# Patient Record
Sex: FEMALE | Race: WHITE
[De-identification: names, ages, dates, MRNs, and addresses within clinical notes are randomized per-mention and may not be internally consistent; named-entity substitution may affect disease eponyms.]

---

## 2018-12-06 ENCOUNTER — HOSPITAL ENCOUNTER (OUTPATIENT)
Dept: HOSPITAL 92 - BICMAMMO | Age: 43
Discharge: HOME | End: 2018-12-06
Attending: OBSTETRICS & GYNECOLOGY
Payer: COMMERCIAL

## 2018-12-06 DIAGNOSIS — Z80.3: ICD-10-CM

## 2018-12-06 DIAGNOSIS — Z85.820: ICD-10-CM

## 2018-12-06 DIAGNOSIS — Z12.31: Primary | ICD-10-CM

## 2018-12-06 PROCEDURE — 77067 SCR MAMMO BI INCL CAD: CPT

## 2018-12-06 PROCEDURE — 77063 BREAST TOMOSYNTHESIS BI: CPT

## 2019-03-21 ENCOUNTER — HOSPITAL ENCOUNTER (EMERGENCY)
Dept: HOSPITAL 92 - SCSER | Age: 44
Discharge: HOME | End: 2019-03-21
Payer: COMMERCIAL

## 2019-03-21 DIAGNOSIS — E10.9: ICD-10-CM

## 2019-03-21 DIAGNOSIS — R19.7: ICD-10-CM

## 2019-03-21 DIAGNOSIS — E03.9: ICD-10-CM

## 2019-03-21 DIAGNOSIS — F32.9: ICD-10-CM

## 2019-03-21 DIAGNOSIS — R11.2: Primary | ICD-10-CM

## 2019-03-21 LAB
ALBUMIN SERPL BCG-MCNC: 4 G/DL (ref 3.5–5)
ALP SERPL-CCNC: 77 U/L (ref 40–150)
ALT SERPL W P-5'-P-CCNC: 21 U/L (ref 8–55)
ANION GAP SERPL CALC-SCNC: 11 MMOL/L (ref 10–20)
AST SERPL-CCNC: 25 U/L (ref 5–34)
BASOPHILS # BLD AUTO: 0 THOU/UL (ref 0–0.2)
BASOPHILS NFR BLD AUTO: 0.7 % (ref 0–1)
BILIRUB SERPL-MCNC: 0.4 MG/DL (ref 0.2–1.2)
BUN SERPL-MCNC: 16 MG/DL (ref 7–18.7)
CALCIUM SERPL-MCNC: 9.2 MG/DL (ref 7.8–10.44)
CHLORIDE SERPL-SCNC: 109 MMOL/L (ref 98–107)
CO2 SERPL-SCNC: 25 MMOL/L (ref 22–29)
CREAT CL PREDICTED SERPL C-G-VRATE: 0 ML/MIN (ref 70–130)
EOSINOPHIL # BLD AUTO: 0.1 THOU/UL (ref 0–0.7)
EOSINOPHIL NFR BLD AUTO: 1 % (ref 0–10)
GLOBULIN SER CALC-MCNC: 2.6 G/DL (ref 2.4–3.5)
GLUCOSE SERPL-MCNC: 205 MG/DL (ref 70–105)
GLUCOSE UR STRIP-MCNC: 500 MG/DL
HGB BLD-MCNC: 14.4 G/DL (ref 12–16)
LIPASE SERPL-CCNC: 37 U/L (ref 8–78)
LYMPHOCYTES # BLD: 0.8 THOU/UL (ref 1.2–3.4)
LYMPHOCYTES NFR BLD AUTO: 10.8 % (ref 21–51)
MCH RBC QN AUTO: 28.9 PG (ref 27–31)
MCV RBC AUTO: 85.8 FL (ref 78–98)
MONOCYTES # BLD AUTO: 0.4 THOU/UL (ref 0.11–0.59)
MONOCYTES NFR BLD AUTO: 4.9 % (ref 0–10)
NEUTROPHILS # BLD AUTO: 6.1 THOU/UL (ref 1.4–6.5)
NEUTROPHILS NFR BLD AUTO: 82.6 % (ref 42–75)
PLATELET # BLD AUTO: 211 THOU/UL (ref 130–400)
POTASSIUM SERPL-SCNC: 4 MMOL/L (ref 3.5–5.1)
RBC # BLD AUTO: 4.99 MILL/UL (ref 4.2–5.4)
SODIUM SERPL-SCNC: 141 MMOL/L (ref 136–145)
SP GR UR STRIP: 1.01 (ref 1–1.03)
WBC # BLD AUTO: 7.4 THOU/UL (ref 4.8–10.8)

## 2019-03-21 PROCEDURE — 36416 COLLJ CAPILLARY BLOOD SPEC: CPT

## 2019-03-21 PROCEDURE — 83690 ASSAY OF LIPASE: CPT

## 2019-03-21 PROCEDURE — 80053 COMPREHEN METABOLIC PANEL: CPT

## 2019-03-21 PROCEDURE — 96361 HYDRATE IV INFUSION ADD-ON: CPT

## 2019-03-21 PROCEDURE — 85025 COMPLETE CBC W/AUTO DIFF WBC: CPT

## 2019-03-21 PROCEDURE — 96374 THER/PROPH/DIAG INJ IV PUSH: CPT

## 2019-03-21 PROCEDURE — 82010 KETONE BODYS QUAN: CPT

## 2019-03-21 PROCEDURE — 83605 ASSAY OF LACTIC ACID: CPT

## 2019-03-21 PROCEDURE — 81003 URINALYSIS AUTO W/O SCOPE: CPT

## 2020-12-23 ENCOUNTER — HOSPITAL ENCOUNTER (OUTPATIENT)
Dept: HOSPITAL 92 - BICMAMMO | Age: 45
Discharge: HOME | End: 2020-12-23
Attending: OBSTETRICS & GYNECOLOGY
Payer: COMMERCIAL

## 2020-12-23 DIAGNOSIS — Z12.31: Primary | ICD-10-CM

## 2020-12-23 DIAGNOSIS — Z80.3: ICD-10-CM

## 2020-12-23 DIAGNOSIS — N64.89: ICD-10-CM

## 2020-12-23 PROCEDURE — 77063 BREAST TOMOSYNTHESIS BI: CPT

## 2020-12-23 PROCEDURE — 77067 SCR MAMMO BI INCL CAD: CPT

## 2020-12-23 NOTE — MMO
Bilateral MAMMO Bilat Screen [facility].

 

CLINICAL HISTORY:

Patient is 45 years old and is seen for screening. The patient has the following

family history of breast cancer:  aunt, at age 60.  The patient has no personal

history of cancer.

 

VIEWS:

The views performed were:  bilateral craniocaudal with tomosynthesis and

bilateral mediolateral oblique with tomosynthesis.

 

FILMS COMPARED:

The present examination has been compared to a prior imaging study performed at

[facility] on 12/06/2018.

 

This study has been interpreted with the assistance of computer-aided detection.

 

MAMMOGRAM FINDINGS:

There are scattered fibroglandular densities.

 

There is a new focal asymmetry measuring 6 millimeters with circumscribed

margins seen in the CC view only seen in the inner region of the right breast.

 

In the left breast, there are no suspicious masses, calcifications or areas of

architectural distortion.

 

IMPRESSION:

NEW FOCAL ASYMMETRY IN THE RIGHT BREAST REQUIRES ADDITIONAL EVALUATION.

ADDITIONAL PROJECTIONS (RIGHT CRANIOCAUDAL SPOT COMPRESSION AND RIGHT

MEDIOLATERAL SPOT COMPRESSION) ARE RECOMMENDED. AN ULTRASOUND EXAM IS

RECOMMENDED IF NEEDED. ADDITIONAL IMAGING.

 

THE RESULTS OF THIS EXAM WERE SENT TO THE PATIENT.

 

ACR BI-RADS Category 0 - Incomplete:  Need additional imaging evaluation. [facility] will notify the patient of the need for additional imaging services.

 

MAMMOGRAPHY NOTE:

 1. A negative mammogram report should not delay a biopsy if a dominant of

 clinically suspicious mass is present.

 2. Approximately 10% to 15% of breast cancers are not detected by

 mammography.

 3. Adenosis and dense breasts may obscure an underlying neoplasm.

 

 

Reported by: SUNG RG MD

Electonically Signed: [id number]

## 2021-01-06 ENCOUNTER — HOSPITAL ENCOUNTER (OUTPATIENT)
Dept: HOSPITAL 92 - BICMAMMO | Age: 46
Discharge: HOME | End: 2021-01-06
Attending: OBSTETRICS & GYNECOLOGY
Payer: COMMERCIAL

## 2021-01-06 DIAGNOSIS — N60.01: ICD-10-CM

## 2021-01-06 DIAGNOSIS — N64.89: Primary | ICD-10-CM

## 2021-01-06 PROCEDURE — G0279 TOMOSYNTHESIS, MAMMO: HCPCS

## 2021-01-06 NOTE — ULT
RIGHT BREAST ULTRASOUND:

 

HISTORY: 

Followup abnormal mammogram.

 

COMPARISON: 

Mammogram 12/23/2020, 1/6/2021.

 

FINDINGS: 

In the right breast at 5 o'clock, 2 cm from the nipple, there is a slightly lobulated septated-appear
ing breast cyst measuring 0.6 x 0.7 x 0.6 cm in size.  This corresponds to the abnormal mammographic 
finding.

 

IMPRESSION: 

Benign septated cyst corresponding to the mammographic finding.  BIRADS category 2 - benign findings.
  Continued annual followup screening mammograms.

 

POS: OFF

## 2021-01-06 NOTE — MMO
Right Breast MAMMO Unilat Diag [facility]PINA.

 

CLINICAL HISTORY:

Patient is 45 years old and is seen for diagnostic exam. The patient has the

following family history of breast cancer:  aunt, at age 60.  The patient has no

personal history of cancer.

 

VIEWS:

The views performed were:  right craniocaudal with tomosynthesis; right

mediolateral oblique with tomosynthesis; and right mediolateral with

tomosynthesis.

 

FILMS COMPARED:

The present examination has been compared to prior imaging studies performed at

[facility] on 12/06/2018, 12/23/2020 and 01/06/2021.

 

This study has been interpreted with the assistance of computer-aided detection.

 

MAMMOGRAM FINDINGS:

The breast is heterogeneously dense, which could obscure a lesion on mammography.

 

There is a lobular mass measuring 6 x 8 x 9 mm with circumscribed margins seen

in the middle region of the right breast at 5 o'clock.

 

Septated cyst ultrasound

 

There are no suspicious masses, suspicious calcifications, or new areas of

architectural distortion.

 

IMPRESSION:

THERE IS NO MAMMOGRAPHIC EVIDENCE OF MALIGNANCY.

 

A ROUTINE FOLLOW-UP MAMMOGRAM IN 1 YEAR IS RECOMMENDED.

 

THE RESULTS OF THIS EXAM WERE SENT TO THE PATIENT.

 

ACR BI-RADS Category 2 - Benign finding

 

MAMMOGRAPHY NOTE:

 1. A negative mammogram report should not delay a biopsy if a dominant of

 clinically suspicious mass is present.

 2. Approximately 10% to 15% of breast cancers are not detected by

 mammography.

 3. Adenosis and dense breasts may obscure an underlying neoplasm.

 

 

Reported by: SUNG RG MD

Electonically Signed: [id number]